# Patient Record
Sex: FEMALE | Race: WHITE | URBAN - METROPOLITAN AREA
[De-identification: names, ages, dates, MRNs, and addresses within clinical notes are randomized per-mention and may not be internally consistent; named-entity substitution may affect disease eponyms.]

---

## 2019-02-13 ENCOUNTER — OFFICE VISIT (OUTPATIENT)
Dept: FAMILY MEDICINE CLINIC | Facility: CLINIC | Age: 49
End: 2019-02-13
Payer: COMMERCIAL

## 2019-02-13 VITALS
DIASTOLIC BLOOD PRESSURE: 98 MMHG | HEART RATE: 85 BPM | SYSTOLIC BLOOD PRESSURE: 140 MMHG | HEIGHT: 64 IN | BODY MASS INDEX: 28.51 KG/M2 | TEMPERATURE: 98 F | OXYGEN SATURATION: 98 % | WEIGHT: 167 LBS | RESPIRATION RATE: 18 BRPM

## 2019-02-13 DIAGNOSIS — E03.9 HYPOTHYROIDISM, UNSPECIFIED TYPE: ICD-10-CM

## 2019-02-13 DIAGNOSIS — F32.A DEPRESSION, UNSPECIFIED DEPRESSION TYPE: ICD-10-CM

## 2019-02-13 DIAGNOSIS — I10 ESSENTIAL HYPERTENSION: Primary | ICD-10-CM

## 2019-02-13 DIAGNOSIS — I42.9 CARDIOMYOPATHY, UNSPECIFIED TYPE (HCC): ICD-10-CM

## 2019-02-13 DIAGNOSIS — R94.31 ABNORMAL EKG: ICD-10-CM

## 2019-02-13 DIAGNOSIS — F41.1 ANXIETY STATE: ICD-10-CM

## 2019-02-13 PROCEDURE — 99204 OFFICE O/P NEW MOD 45 MIN: CPT | Performed by: FAMILY MEDICINE

## 2019-02-13 PROCEDURE — 93000 ELECTROCARDIOGRAM COMPLETE: CPT | Performed by: FAMILY MEDICINE

## 2019-02-13 RX ORDER — ATORVASTATIN CALCIUM 20 MG/1
20 TABLET, FILM COATED ORAL
Refills: 2 | COMMUNITY
Start: 2019-02-01

## 2019-02-13 RX ORDER — LEVOTHYROXINE SODIUM 112 MCG
TABLET ORAL
Refills: 5 | COMMUNITY
Start: 2019-01-21

## 2019-02-13 RX ORDER — LORATADINE 10 MG/1
10 TABLET ORAL DAILY
COMMUNITY

## 2019-02-13 RX ORDER — SERTRALINE HYDROCHLORIDE 25 MG/1
25 TABLET, FILM COATED ORAL DAILY
Qty: 30 TABLET | Refills: 2 | Status: SHIPPED | OUTPATIENT
Start: 2019-02-13 | End: 2019-03-13

## 2019-02-13 RX ORDER — CARVEDILOL 3.12 MG/1
3.12 TABLET ORAL 2 TIMES DAILY
Refills: 1 | COMMUNITY
Start: 2019-02-01 | End: 2019-04-02

## 2019-02-13 RX ORDER — OMEPRAZOLE 20 MG/1
20 CAPSULE, DELAYED RELEASE ORAL
Refills: 1 | COMMUNITY
Start: 2019-02-01

## 2019-02-13 RX ORDER — LISINOPRIL 10 MG/1
10 TABLET ORAL DAILY
Qty: 30 TABLET | Refills: 2 | Status: SHIPPED | OUTPATIENT
Start: 2019-02-13 | End: 2019-03-13

## 2019-02-13 RX ORDER — ALPRAZOLAM 0.25 MG/1
TABLET ORAL
COMMUNITY
Start: 2011-12-28

## 2019-02-13 RX ORDER — MONTELUKAST SODIUM 10 MG/1
10 TABLET ORAL
Refills: 2 | COMMUNITY
Start: 2019-02-01

## 2019-02-13 NOTE — PROGRESS NOTES
[facility] [place]  PROGRESS NOTE  Chief Complaint:   Patient presents with:  Blood Pressure  Establish Care      HPI:   This is a 51year old female presents to clinic to establish care.   Patient has history of hypertension and anxiety along wi (two) times daily. Disp:  Rfl: 1   SYNTHROID 112 MCG Oral Tab TAKE ONE TABLET BY MOUTH EVERY MORNING ON AN EMPTY STOMACH Disp:  Rfl: 5   omeprazole 20 MG Oral Capsule Delayed Release Take 20 mg by mouth once daily.  Disp:  Rfl: 1   Montelukast Sodium 10 MG Patient Position: Sitting, Cuff Size: adult)   Pulse 85   Temp 97.9 °F (36.6 °C) (Temporal)   Resp 18   Ht 64\"   Wt 167 lb   SpO2 98%   BMI 28.67 kg/m²  Estimated body mass index is 28.67 kg/m² as calculated from the following:    Height as of this encoun Cancel: COMP METABOLIC PANEL (14);  Future    Depression, unspecified depression type  -     OP REFERRAL TO [facility]    Hypothyroidism, unspecified type  -     Cancel: TSH+FREE T4; Future  -     TSH+FREE T4; Future    Cardiomy[facility], unspecified type ([facility] Jeremías Hunt MD

## 2019-02-13 NOTE — PATIENT INSTRUCTIONS
Continue current medications  Start lisinopril and zoloft. Schedule labs and echocardiogram  Advice low salt diet and exercise. Monitor your blood pressure. Return to clinic if systolic blood pressure more than 730 or diastolic more than 150.   See a coun

## 2019-02-14 ENCOUNTER — TELEPHONE (OUTPATIENT)
Dept: FAMILY MEDICINE CLINIC | Facility: CLINIC | Age: 49
End: 2019-02-14

## 2019-02-14 DIAGNOSIS — R94.31 ABNORMAL EKG: ICD-10-CM

## 2019-02-14 DIAGNOSIS — I10 ESSENTIAL HYPERTENSION: ICD-10-CM

## 2019-02-14 DIAGNOSIS — I42.9 CARDIOMYOPATHY, UNSPECIFIED TYPE (HCC): Primary | ICD-10-CM

## 2019-02-14 NOTE — TELEPHONE ENCOUNTER
Patient states she spoke with her insurance and she cannot have the Echo done at [facility]. States she has to go to [facility]. Asking for order to be faxed to [phone]. Do we need to place a new order?   Unsure who would read the Echo if done

## 2019-02-14 NOTE — TELEPHONE ENCOUNTER
Order for Echo was faxed to [facility] patient scheduling yesterday. LM for patient to return call.

## 2019-02-15 ENCOUNTER — TELEPHONE (OUTPATIENT)
Dept: FAMILY MEDICINE CLINIC | Facility: CLINIC | Age: 49
End: 2019-02-15

## 2019-02-15 NOTE — TELEPHONE ENCOUNTER
Patient is currently at [facility], [facility] did not receive patients [facility] orders, would like them fax to [phone] [facility] since patient is already there.

## 2019-02-16 LAB
ABSOLUTE BASOPHILS: 83 CELLS/UL (ref 0–200)
ABSOLUTE EOSINOPHILS: 158 CELLS/UL (ref 15–500)
ABSOLUTE LYMPHOCYTES: 2291 CELLS/UL (ref 850–3900)
ABSOLUTE MONOCYTES: 523 CELLS/UL (ref 200–950)
ABSOLUTE NEUTROPHILS: 5246 CELLS/UL (ref 1500–7800)
ALBUMIN/GLOBULIN RATIO: 1.7 (CALC) (ref 1–2.5)
ALBUMIN: 4.3 G/DL (ref 3.6–5.1)
ALKALINE PHOSPHATASE: 115 U/L (ref 33–115)
ALT: 16 U/L (ref 6–29)
AST: 14 U/L (ref 10–35)
BASOPHILS: 1 %
BILIRUBIN, TOTAL: 0.7 MG/DL (ref 0.2–1.2)
BUN: 12 MG/DL (ref 7–25)
CALCIUM: 9.4 MG/DL (ref 8.6–10.2)
CARBON DIOXIDE: 25 MMOL/L (ref 20–32)
CHLORIDE: 106 MMOL/L (ref 98–110)
CREATININE: 1 MG/DL (ref 0.5–1.1)
EGFR IF AFRICN AM: 77 ML/MIN/1.73M2
EGFR IF NONAFRICN AM: 66 ML/MIN/1.73M2
EOSINOPHILS: 1.9 %
GLOBULIN: 2.5 G/DL (CALC) (ref 1.9–3.7)
GLUCOSE: 97 MG/DL (ref 65–99)
HEMATOCRIT: 43.7 % (ref 35–45)
HEMOGLOBIN: 15.3 G/DL (ref 11.7–15.5)
LYMPHOCYTES: 27.6 %
MCH: 30.4 PG (ref 27–33)
MCHC: 35 G/DL (ref 32–36)
MCV: 86.9 FL (ref 80–100)
MONOCYTES: 6.3 %
MPV: 10.5 FL (ref 7.5–12.5)
NEUTROPHILS: 63.2 %
PLATELET COUNT: 286 THOUSAND/UL (ref 140–400)
POTASSIUM: 4.7 MMOL/L (ref 3.5–5.3)
PROTEIN, TOTAL: 6.8 G/DL (ref 6.1–8.1)
RDW: 12.8 % (ref 11–15)
RED BLOOD CELL COUNT: 5.03 MILLION/UL (ref 3.8–5.1)
SODIUM: 139 MMOL/L (ref 135–146)
T4, FREE: 1.4 NG/DL (ref 0.8–1.8)
TSH: 1.28 MIU/L
WHITE BLOOD CELL COUNT: 8.3 THOUSAND/UL (ref 3.8–10.8)

## 2019-02-18 ENCOUNTER — TELEPHONE (OUTPATIENT)
Dept: FAMILY MEDICINE CLINIC | Facility: CLINIC | Age: 49
End: 2019-02-18

## 2019-02-18 NOTE — TELEPHONE ENCOUNTER
----- Message from Mansoor Muro MD sent at 2/18/2019  1:41 PM CST -----  Please inform patient that cbc, cmp, tsh and free t4 is normal.

## 2019-02-18 NOTE — TELEPHONE ENCOUNTER
Informed pt of her blood work results. Pt expressed understanding and thanks. Pt had echo on Friday and will be sending the results to our office.

## 2019-02-19 ENCOUNTER — TELEPHONE (OUTPATIENT)
Dept: FAMILY MEDICINE CLINIC | Facility: CLINIC | Age: 49
End: 2019-02-19

## 2019-02-19 DIAGNOSIS — I10 ESSENTIAL HYPERTENSION: Primary | ICD-10-CM

## 2019-02-19 DIAGNOSIS — I42.9 CARDIOMYOPATHY, UNSPECIFIED TYPE (HCC): ICD-10-CM

## 2019-02-19 NOTE — TELEPHONE ENCOUNTER
Let pt know the following below. Pt verbalized her understanding and had no other questions at this time. Patient would like to see Dr Lisseth Wilcox.  Patient states that she has seen him in the past but got new insurance so will need another referral and has

## 2019-02-19 NOTE — TELEPHONE ENCOUNTER
Please inform patient that her left ventricular sizes are normal, her left ventricular function is slightly reduced, her ejection fraction is 50% which is also slightly reduced when compared to July 2016. She has some mild mitral regurgitation.   Recommend

## 2019-03-13 PROBLEM — R07.9 ACUTE CHEST PAIN: Status: ACTIVE | Noted: 2019-03-02

## 2019-03-13 PROBLEM — E03.9 ACQUIRED HYPOTHYROIDISM: Status: ACTIVE | Noted: 2019-02-13

## 2019-03-13 PROBLEM — J30.89 NON-SEASONAL ALLERGIC RHINITIS DUE TO FUNGAL SPORES: Status: ACTIVE | Noted: 2019-03-13

## 2019-03-13 PROBLEM — N20.0 CALCULUS OF KIDNEY: Status: ACTIVE | Noted: 2019-03-13

## 2019-03-13 PROBLEM — F41.1 GAD (GENERALIZED ANXIETY DISORDER): Status: ACTIVE | Noted: 2019-03-13

## 2019-03-13 NOTE — PROGRESS NOTES
[facility] [place]  PROGRESS NOTE  Chief Complaint:   Patient presents with:  Medication Follow-Up  ER F/U      HPI:   This is a 51year old female coming in for follow-up of admission to the hospital.  She is admitted to [address] [place] [address] - 45.0 %    MCV 86.9 80.0 - 100.0 fL    MCH 30.4 27.0 - 33.0 pg    MCHC 35.0 32.0 - 36.0 g/dL    RDW 12.8 11.0 - 15.0 %    PLATELET COUNT 629 992 - 400 Thousand/uL    MPV 10.5 7.5 - 12.5 fL    ABSOLUTE NEUTROPHILS 5,246 1,500 - 7,800 cells/uL    ABSOLUTE L UNKNOWN  Current Meds:    Current Outpatient Medications:  budesonide 0.25 MG/2ML Inhalation Suspension Take 3 mg by nebulization every 4 (four) hours as needed.  Disp:  Rfl:    ipratropium-albuterol 0.5-2.5 (3) MG/3ML Inhalation Solution Evette Sousa seizures, dizziness, syncope, paralysis, ataxia, numbness or tingling in the extremities,change in bowel or bladder control. HEMATOLOGIC:  Denies anemia, bleeding or bruising. LYMPHATICS:  Denies enlarged nodes or history of splenectomy.   PSYCHIATRIC: Re 1. DEEP (generalized anxiety disorder)  She has severe generalized anxiety disorder that is been present for at least 16 years. She has tried many medications for it with no apparent success.   She said that she has not taken Lamictal.  She said that she total) by mouth daily. Patient/Caregiver Education: Patient/Caregiver Education: There are no barriers to learning. Medical education done. Outcome: Patient verbalizes understanding.  Patient is notified to call with any questions, complications, al

## 2019-04-02 RX ORDER — CARVEDILOL 3.12 MG/1
3.12 TABLET ORAL 2 TIMES DAILY
Qty: 180 TABLET | Refills: 1 | Status: SHIPPED | OUTPATIENT
Start: 2019-04-02 | End: 2019-07-26

## 2019-04-02 NOTE — TELEPHONE ENCOUNTER
Future appt:     Your appointments     Date & Time Appointment Department [facility])    Apr 19, 2019 11:30 AM CDT Follow up with Freida Ervin MD [address] [facility] 10, [address]

## 2019-04-19 PROBLEM — Z78.0 MENOPAUSE PRESENT: Status: ACTIVE | Noted: 2019-04-04

## 2019-04-19 PROBLEM — R07.9 CHEST PAIN: Status: ACTIVE | Noted: 2019-03-02

## 2019-04-19 NOTE — PROGRESS NOTES
[address]  PROGRESS NOTE  Chief Complaint:   Patient presents with: Follow - Up      HPI:   This is a 47year old female coming in for follow-up. She said that she feels lousy.   She has frequent episodes of rapid heartbeat associate Million/uL    HEMOGLOBIN 15.3 11.7 - 15.5 g/dL    HEMATOCRIT 43.7 35.0 - 45.0 %    MCV 86.9 80.0 - 100.0 fL    MCH 30.4 27.0 - 33.0 pg    MCHC 35.0 32.0 - 36.0 g/dL    RDW 12.8 11.0 - 15.0 %    PLATELET COUNT 041 874 - 400 Thousand/uL    MPV 10.5 7.5 - 12. utility for ICD-10 conversion Overview:  viral cardiomyopathy dx 2000 Stress test 9/08  Echo 9/08  Echo 2/10/10 - EF 50% Dx database update   • Vitamin D deficiency 11/19/2011     No past surgical history on file.   Social History:  Social History    Tobacc runny nose or sore throat. INTEGUMENTARY:  Denies rashes, itching, skin lesion, or excessive skin dryness.   CARDIOVASCULAR:  Denies chest pain, chest pressure, chest discomfort, palpitations, edema, dyspnea on exertion or at rest.  RESPIRATORY: She does h rubs or gallops. LUNGS: Clear to auscultation bilterally, no rales/rhonchi/wheezing. ABDOMEN:  Soft, nondistended, nontender, bowel sounds normal in all 4 quadrants, no masses, no hepatosplenomegaly.   EXTREMITIES:  No edema, no cyanosis, no clubbing, FRO (generalized anxiety disorder)     Non-seasonal allergic rhinitis due to fungal spores     Menopause present      Patience MD Grzegorz  4/19/2019  6:10 PM

## 2019-07-26 RX ORDER — CARVEDILOL 3.12 MG/1
TABLET ORAL
Qty: 180 TABLET | Refills: 1 | Status: SHIPPED | OUTPATIENT
Start: 2019-07-26 | End: 2020-01-21

## 2019-07-26 NOTE — TELEPHONE ENCOUNTER
Future appt:    Last Appointment:  4/19/2019  No results found for: CHOLEST, HDL, LDL, TRIGLY, TRIG  No results found for: EAG, A1C  Lab Results   Component Value Date    T4F 1.4 02/15/2019    TSH 1.28 02/15/2019       No follow-ups on file.

## 2020-01-21 RX ORDER — CARVEDILOL 3.12 MG/1
TABLET ORAL
Qty: 180 TABLET | Refills: 0 | Status: SHIPPED | OUTPATIENT
Start: 2020-01-21 | End: 2020-04-22

## 2020-01-21 NOTE — TELEPHONE ENCOUNTER
Future appt:    Last Appointment with provider: 4/19/2019    Last appointment at [facility]:  Visit date not found  No results found for: CHOLEST, HDL, LDL, TRIGLY, TRIG  No results found for: EAG, A1C  Lab Results   Component Value Date    T4F 1.4 02/15

## 2020-04-22 RX ORDER — CARVEDILOL 3.12 MG/1
3.12 TABLET ORAL 2 TIMES DAILY
Qty: 180 TABLET | Refills: 0 | Status: SHIPPED | OUTPATIENT
Start: 2020-04-22

## 2020-04-22 NOTE — TELEPHONE ENCOUNTER
M/L overdue for appt. Asked she call back to schedule in June with  .   Suha Millard, 04/22/20, 8:19 AM    Future appt:    Last Appointment with provider:   4/2019  Last appointment at [facility]:  Visit date not found  No results found for: [facility]

## 2021-04-14 ENCOUNTER — PATIENT OUTREACH (OUTPATIENT)
Dept: FAMILY MEDICINE CLINIC | Facility: CLINIC | Age: 51
End: 2021-04-14

## 2021-04-14 NOTE — PROGRESS NOTES
Chart reviewed for care gaps. Patient has not been seen in office since 04/2019. Chart reviewed/Care Everywhere, patient currently following with Dr. Jennifer Gonzalez through [facility]. Change of PCP form submitted.

## (undated) NOTE — LETTER
Date: 2/13/2019    Patient Name: Georgette Ferrari          To Whom it may concern: This letter has been written at the patient's request. The above patient was seen at the [facility] for treatment of a medical condition.     This patient sh